# Patient Record
Sex: MALE | Race: BLACK OR AFRICAN AMERICAN | URBAN - METROPOLITAN AREA
[De-identification: names, ages, dates, MRNs, and addresses within clinical notes are randomized per-mention and may not be internally consistent; named-entity substitution may affect disease eponyms.]

---

## 2019-12-03 ENCOUNTER — NEW PATIENT (OUTPATIENT)
Dept: URBAN - METROPOLITAN AREA CLINIC 51 | Facility: CLINIC | Age: 69
End: 2019-12-03
Payer: MEDICARE

## 2019-12-03 DIAGNOSIS — E11.3521 DIABETES MELLITUS TYPE 2 WITH PROLIFERATIVE DIABET: ICD-10-CM

## 2019-12-03 DIAGNOSIS — Z96.1 PRESENCE OF INTRAOCULAR LENS: ICD-10-CM

## 2019-12-03 DIAGNOSIS — H59.022 CATARACT FRAGMENT IN EYE FOLLOWING CATARACT SURGERY OF LEFT EYE: ICD-10-CM

## 2019-12-03 DIAGNOSIS — E11.3552 DIABETES MELLITUS TYPE 2 WITH STABLE PROLIFERATIVE: ICD-10-CM

## 2019-12-03 DIAGNOSIS — H33.8 OTHER RETINAL DETACHMENTS: Primary | ICD-10-CM

## 2019-12-03 PROCEDURE — 92250 FUNDUS PHOTOGRAPHY W/I&R: CPT | Performed by: OPTOMETRIST

## 2019-12-03 PROCEDURE — 92004 COMPRE OPH EXAM NEW PT 1/>: CPT | Performed by: OPTOMETRIST

## 2019-12-03 ASSESSMENT — KERATOMETRY
OS: 44.03
OD: 44.12

## 2019-12-03 ASSESSMENT — INTRAOCULAR PRESSURE
OD: 8
OS: 10

## 2019-12-03 ASSESSMENT — VISUAL ACUITY
OD: 20/LP
OS: 20/25

## 2019-12-16 ENCOUNTER — FOLLOW UP ESTABLISHED (OUTPATIENT)
Dept: URBAN - METROPOLITAN AREA CLINIC 51 | Facility: CLINIC | Age: 69
End: 2019-12-16
Payer: MEDICARE

## 2019-12-16 PROCEDURE — 92235 FLUORESCEIN ANGRPH MLTIFRAME: CPT | Performed by: OPHTHALMOLOGY

## 2019-12-16 PROCEDURE — 92134 CPTRZ OPH DX IMG PST SGM RTA: CPT | Performed by: OPHTHALMOLOGY

## 2019-12-16 PROCEDURE — 67028 INJECTION EYE DRUG: CPT | Performed by: OPHTHALMOLOGY

## 2019-12-16 PROCEDURE — 92004 COMPRE OPH EXAM NEW PT 1/>: CPT | Performed by: OPHTHALMOLOGY

## 2019-12-16 ASSESSMENT — INTRAOCULAR PRESSURE
OS: 12
OD: 11

## 2020-01-29 ENCOUNTER — FOLLOW UP ESTABLISHED (OUTPATIENT)
Dept: URBAN - METROPOLITAN AREA CLINIC 51 | Facility: CLINIC | Age: 70
End: 2020-01-29
Payer: MEDICARE

## 2020-01-29 PROCEDURE — 92134 CPTRZ OPH DX IMG PST SGM RTA: CPT | Performed by: OPHTHALMOLOGY

## 2020-01-29 PROCEDURE — 92014 COMPRE OPH EXAM EST PT 1/>: CPT | Performed by: OPHTHALMOLOGY

## 2020-01-29 PROCEDURE — 92235 FLUORESCEIN ANGRPH MLTIFRAME: CPT | Performed by: OPHTHALMOLOGY

## 2020-01-29 ASSESSMENT — INTRAOCULAR PRESSURE
OS: 10
OD: 9

## 2020-10-22 ENCOUNTER — FOLLOW UP ESTABLISHED (OUTPATIENT)
Dept: URBAN - METROPOLITAN AREA CLINIC 51 | Facility: CLINIC | Age: 70
End: 2020-10-22
Payer: MEDICARE

## 2020-10-22 DIAGNOSIS — Z79.4 LONG TERM (CURRENT) USE OF INSULIN: ICD-10-CM

## 2020-10-22 PROCEDURE — 92235 FLUORESCEIN ANGRPH MLTIFRAME: CPT | Performed by: OPHTHALMOLOGY

## 2020-10-22 PROCEDURE — 92014 COMPRE OPH EXAM EST PT 1/>: CPT | Performed by: OPHTHALMOLOGY

## 2020-10-22 PROCEDURE — 92134 CPTRZ OPH DX IMG PST SGM RTA: CPT | Performed by: OPHTHALMOLOGY

## 2020-10-22 PROCEDURE — 67028 INJECTION EYE DRUG: CPT | Performed by: OPHTHALMOLOGY

## 2020-10-22 ASSESSMENT — INTRAOCULAR PRESSURE
OS: 13
OD: 10

## 2020-12-02 ENCOUNTER — FOLLOW UP ESTABLISHED (OUTPATIENT)
Dept: URBAN - METROPOLITAN AREA CLINIC 51 | Facility: CLINIC | Age: 70
End: 2020-12-02
Payer: MEDICARE

## 2020-12-02 PROCEDURE — 92235 FLUORESCEIN ANGRPH MLTIFRAME: CPT | Performed by: OPHTHALMOLOGY

## 2020-12-02 PROCEDURE — 99214 OFFICE O/P EST MOD 30 MIN: CPT | Performed by: OPHTHALMOLOGY

## 2020-12-02 PROCEDURE — 92134 CPTRZ OPH DX IMG PST SGM RTA: CPT | Performed by: OPHTHALMOLOGY

## 2020-12-02 ASSESSMENT — INTRAOCULAR PRESSURE
OS: 8
OD: 7

## 2021-03-08 ENCOUNTER — FOLLOW UP ESTABLISHED (OUTPATIENT)
Dept: URBAN - METROPOLITAN AREA CLINIC 51 | Facility: CLINIC | Age: 71
End: 2021-03-08
Payer: MEDICARE

## 2021-03-08 PROCEDURE — 99214 OFFICE O/P EST MOD 30 MIN: CPT | Performed by: OPHTHALMOLOGY

## 2021-03-08 PROCEDURE — 92235 FLUORESCEIN ANGRPH MLTIFRAME: CPT | Performed by: OPHTHALMOLOGY

## 2021-03-08 PROCEDURE — 92134 CPTRZ OPH DX IMG PST SGM RTA: CPT | Performed by: OPHTHALMOLOGY

## 2021-03-08 ASSESSMENT — INTRAOCULAR PRESSURE
OS: 7
OD: 17

## 2021-09-01 ENCOUNTER — OFFICE VISIT (OUTPATIENT)
Dept: URBAN - METROPOLITAN AREA CLINIC 51 | Facility: CLINIC | Age: 71
End: 2021-09-01
Payer: MEDICARE

## 2021-09-01 PROCEDURE — 92235 FLUORESCEIN ANGRPH MLTIFRAME: CPT | Performed by: OPHTHALMOLOGY

## 2021-09-01 PROCEDURE — 99214 OFFICE O/P EST MOD 30 MIN: CPT | Performed by: OPHTHALMOLOGY

## 2021-09-01 PROCEDURE — 67028 INJECTION EYE DRUG: CPT | Performed by: OPHTHALMOLOGY

## 2021-09-01 PROCEDURE — 92134 CPTRZ OPH DX IMG PST SGM RTA: CPT | Performed by: OPHTHALMOLOGY

## 2021-09-01 ASSESSMENT — INTRAOCULAR PRESSURE
OS: 19
OD: 8

## 2021-09-01 NOTE — IMPRESSION/PLAN
Impression: Proliferative Diabetic Retinopathy, OS.
s/p Avastin 12/02/20 Plan: Exam, OCT, and FA reveal PDR, NV Goal is to preserve the VF. He can have more PRP if needed. Inject Avastin as necessary. Schedule 1 Month Reeval, Dil OU, OCT, RNFL.

## 2021-09-01 NOTE — IMPRESSION/PLAN
Impression: Insulin Plan: Last HA1C of 5, taken 09/19. Blood sugar control. Denies tobacco usage. Reports hypertension.

## 2021-09-01 NOTE — IMPRESSION/PLAN
Impression: Proliferative Diabetic Retinopathy, TRD, OD.
s/p PPV x 2 - Dr. Alexandru Briceño. Plan: Hd 2 surgeries. Informed him despite the surgeries, which were well performed. He has no further options for surgery.

## 2021-10-06 ENCOUNTER — OFFICE VISIT (OUTPATIENT)
Dept: URBAN - METROPOLITAN AREA CLINIC 51 | Facility: CLINIC | Age: 71
End: 2021-10-06
Payer: MEDICARE

## 2021-10-06 DIAGNOSIS — E11.3592 TYPE 2 DIABETES MELLITUS WITH PROLIFERATIVE DIABETIC RETINOPATHY WITHOUT MACULAR EDEMA, LEFT EYE: Primary | ICD-10-CM

## 2021-10-06 PROCEDURE — 99214 OFFICE O/P EST MOD 30 MIN: CPT | Performed by: OPHTHALMOLOGY

## 2021-10-06 PROCEDURE — 67028 INJECTION EYE DRUG: CPT | Performed by: OPHTHALMOLOGY

## 2021-10-06 PROCEDURE — 92134 CPTRZ OPH DX IMG PST SGM RTA: CPT | Performed by: OPHTHALMOLOGY

## 2021-10-06 ASSESSMENT — INTRAOCULAR PRESSURE
OS: 7
OD: 7

## 2021-10-06 NOTE — IMPRESSION/PLAN
Impression: Proliferative Diabetic Retinopathy, OS.
s/p Avastin 09/01/21 Plan: Exam, OCT, and FA reveal PDR, NV Goal is to preserve the VF. He can have more PRP if needed. Inject Avastin as necessary. 6 Week Reeval, Dil OU, OCT, RNFL, FA OD>OS, quads.

## 2021-10-06 NOTE — IMPRESSION/PLAN
Impression: Proliferative Diabetic Retinopathy, TRD, OD.
s/p PPV x 2 - Dr. Gardenr List. Plan: Hd 2 surgeries. Informed him despite the surgeries, which were well performed. He has no further options for surgery.

## 2021-11-18 ENCOUNTER — OFFICE VISIT (OUTPATIENT)
Dept: URBAN - METROPOLITAN AREA CLINIC 51 | Facility: CLINIC | Age: 71
End: 2021-11-18
Payer: MEDICARE

## 2021-11-18 PROCEDURE — 92235 FLUORESCEIN ANGRPH MLTIFRAME: CPT | Performed by: OPHTHALMOLOGY

## 2021-11-18 PROCEDURE — 99214 OFFICE O/P EST MOD 30 MIN: CPT | Performed by: OPHTHALMOLOGY

## 2021-11-18 PROCEDURE — 92134 CPTRZ OPH DX IMG PST SGM RTA: CPT | Performed by: OPHTHALMOLOGY

## 2021-11-18 ASSESSMENT — INTRAOCULAR PRESSURE
OD: 5
OS: 11

## 2021-11-18 NOTE — IMPRESSION/PLAN
Impression: Proliferative Diabetic Retinopathy, TRD, OD.
s/p PPV x 2 - Dr. Matty Burns. Plan: Had 2 surgeries. Informed him despite the surgeries, which were well performed. He has no further options for surgery.

## 2021-11-18 NOTE — IMPRESSION/PLAN
Impression: Proliferative Diabetic Retinopathy, OS.
s/p Avastin 10/06/21 Plan: Exam, OCT, and FA reveal PDR, NV Goal is to preserve the VF. He can have more PRP if needed. Continue Avastin as necessary. 3 Month Reeval, Dil OU, OCT, RNFL, FA OD>OS, quads.

## 2022-03-07 ENCOUNTER — OFFICE VISIT (OUTPATIENT)
Dept: URBAN - METROPOLITAN AREA CLINIC 51 | Facility: CLINIC | Age: 72
End: 2022-03-07
Payer: MEDICARE

## 2022-03-07 PROCEDURE — 67028 INJECTION EYE DRUG: CPT | Performed by: OPHTHALMOLOGY

## 2022-03-07 PROCEDURE — 99214 OFFICE O/P EST MOD 30 MIN: CPT | Performed by: OPHTHALMOLOGY

## 2022-03-07 PROCEDURE — 92134 CPTRZ OPH DX IMG PST SGM RTA: CPT | Performed by: OPHTHALMOLOGY

## 2022-03-07 ASSESSMENT — INTRAOCULAR PRESSURE
OS: 13
OD: 12

## 2022-03-07 NOTE — IMPRESSION/PLAN
Impression: Proliferative Diabetic Retinopathy, TRD, OD.
s/p PPV x 2 - Dr. Bob Lovett. Plan: Had 2 surgeries. Informed him despite the surgeries, which were well performed. He has no further options for surgery.

## 2022-03-07 NOTE — IMPRESSION/PLAN
Impression: Proliferative Diabetic Retinopathy, OS.
s/p Avastin 10/06/21 Plan: Exam, OCT, and FA reveal PDR, NV Goal is to preserve the VF. He can have more PRP if needed. Inject Avastin today, plan injection every 2 months.

## 2022-04-20 ENCOUNTER — OFFICE VISIT (OUTPATIENT)
Dept: URBAN - METROPOLITAN AREA CLINIC 51 | Facility: CLINIC | Age: 72
End: 2022-04-20
Payer: MEDICARE

## 2022-04-20 DIAGNOSIS — E11.3521 TYPE 2 DIABETES MELLITUS WITH PROLIFERATIVE DIABETIC RETINOPATHY WITH TRACTION RETINAL DETACHMENT INVOLVING THE MACULA, RIGHT EYE: Primary | ICD-10-CM

## 2022-04-20 DIAGNOSIS — E11.3592 TYPE 2 DIABETES MELLITUS WITH PROLIFERATIVE DIABETIC RETINOPATHY WITHOUT MACULAR EDEMA, LEFT EYE: ICD-10-CM

## 2022-04-20 PROCEDURE — 99212 OFFICE O/P EST SF 10 MIN: CPT | Performed by: OPHTHALMOLOGY

## 2022-04-20 PROCEDURE — 67028 INJECTION EYE DRUG: CPT | Performed by: OPHTHALMOLOGY

## 2022-04-20 PROCEDURE — 92235 FLUORESCEIN ANGRPH MLTIFRAME: CPT | Performed by: OPHTHALMOLOGY

## 2022-04-20 PROCEDURE — 92134 CPTRZ OPH DX IMG PST SGM RTA: CPT | Performed by: OPHTHALMOLOGY

## 2022-04-20 ASSESSMENT — INTRAOCULAR PRESSURE
OS: 11
OD: 11

## 2022-04-20 NOTE — IMPRESSION/PLAN
Impression: Proliferative Diabetic Retinopathy, TRD, OD.
s/p PPV x 2 - Dr. Simon Ortega. Plan: Had 2 surgeries. Informed him despite the surgeries, which were well performed. He has no further options for surgery.

## 2022-06-27 ENCOUNTER — OFFICE VISIT (OUTPATIENT)
Dept: URBAN - METROPOLITAN AREA CLINIC 51 | Facility: CLINIC | Age: 72
End: 2022-06-27
Payer: MEDICARE

## 2022-06-27 DIAGNOSIS — E11.3521 TYPE 2 DIABETES MELLITUS WITH PROLIFERATIVE DIABETIC RETINOPATHY WITH TRACTION RETINAL DETACHMENT INVOLVING THE MACULA, RIGHT EYE: Primary | ICD-10-CM

## 2022-06-27 DIAGNOSIS — E11.3592 TYPE 2 DIABETES MELLITUS WITH PROLIFERATIVE DIABETIC RETINOPATHY WITHOUT MACULAR EDEMA, LEFT EYE: ICD-10-CM

## 2022-06-27 PROCEDURE — 92235 FLUORESCEIN ANGRPH MLTIFRAME: CPT | Performed by: OPHTHALMOLOGY

## 2022-06-27 PROCEDURE — 67028 INJECTION EYE DRUG: CPT | Performed by: OPHTHALMOLOGY

## 2022-06-27 PROCEDURE — 99212 OFFICE O/P EST SF 10 MIN: CPT | Performed by: OPHTHALMOLOGY

## 2022-06-27 PROCEDURE — 92134 CPTRZ OPH DX IMG PST SGM RTA: CPT | Performed by: OPHTHALMOLOGY

## 2022-06-27 ASSESSMENT — INTRAOCULAR PRESSURE
OS: 12
OD: 8

## 2022-06-27 NOTE — IMPRESSION/PLAN
Impression: Proliferative Diabetic Retinopathy, TRD, OD.
s/p PPV x 2 - Dr. Db Menjivar. Plan: Had 2 surgeries. Informed him despite the surgeries, which were well performed. He has no further options for surgery.

## 2022-08-25 ENCOUNTER — OFFICE VISIT (OUTPATIENT)
Dept: URBAN - METROPOLITAN AREA CLINIC 51 | Facility: CLINIC | Age: 72
End: 2022-08-25
Payer: MEDICARE

## 2022-08-25 DIAGNOSIS — E11.3592 TYPE 2 DIABETES MELLITUS WITH PROLIFERATIVE DIABETIC RETINOPATHY WITHOUT MACULAR EDEMA, LEFT EYE: ICD-10-CM

## 2022-08-25 DIAGNOSIS — E11.3521 TYPE 2 DIABETES MELLITUS WITH PROLIFERATIVE DIABETIC RETINOPATHY WITH TRACTION RETINAL DETACHMENT INVOLVING THE MACULA, RIGHT EYE: Primary | ICD-10-CM

## 2022-08-25 DIAGNOSIS — H59.022 CATARACT (LENS) FRAGMENTS IN EYE FOLLOWING CATARACT SURGERY, LEFT EYE: ICD-10-CM

## 2022-08-25 PROCEDURE — 67028 INJECTION EYE DRUG: CPT | Performed by: OPHTHALMOLOGY

## 2022-08-25 PROCEDURE — 92134 CPTRZ OPH DX IMG PST SGM RTA: CPT | Performed by: OPHTHALMOLOGY

## 2022-08-25 PROCEDURE — 99212 OFFICE O/P EST SF 10 MIN: CPT | Performed by: OPHTHALMOLOGY

## 2022-08-25 ASSESSMENT — INTRAOCULAR PRESSURE
OS: 9
OD: 9

## 2022-08-25 NOTE — IMPRESSION/PLAN
Impression: Proliferative Diabetic Retinopathy, TRD, OD.
s/p PPV x 2 - Dr. Dipti García. Plan: Had 2 surgeries. Informed him despite the surgeries, which were well performed. He has no further options for surgery.

## 2022-08-25 NOTE — IMPRESSION/PLAN
Impression: Proliferative Diabetic Retinopathy, OS. Plan: Exam, OCT, and FA reveal PDR, NV Goal is to preserve the VF. He can have  PRP if needed. Inject Avastin OS today, plan injection every 2 months.

## 2022-11-02 ENCOUNTER — OFFICE VISIT (OUTPATIENT)
Dept: URBAN - METROPOLITAN AREA CLINIC 51 | Facility: CLINIC | Age: 72
End: 2022-11-02
Payer: MEDICARE

## 2022-11-02 DIAGNOSIS — E11.3521 TYPE 2 DIABETES MELLITUS WITH PROLIFERATIVE DIABETIC RETINOPATHY WITH TRACTION RETINAL DETACHMENT INVOLVING THE MACULA, RIGHT EYE: ICD-10-CM

## 2022-11-02 DIAGNOSIS — E11.3592 DIABETES MELLITUS TYPE 2 WITH PROLIFERATIVE RETINOPATHY WITHOUT MACULAR EDEMA, LEFT EYE: Primary | ICD-10-CM

## 2022-11-02 PROCEDURE — 92134 CPTRZ OPH DX IMG PST SGM RTA: CPT | Performed by: OPHTHALMOLOGY

## 2022-11-02 PROCEDURE — 92235 FLUORESCEIN ANGRPH MLTIFRAME: CPT | Performed by: OPHTHALMOLOGY

## 2022-11-02 PROCEDURE — 99212 OFFICE O/P EST SF 10 MIN: CPT | Performed by: OPHTHALMOLOGY

## 2022-11-02 PROCEDURE — 67028 INJECTION EYE DRUG: CPT | Performed by: OPHTHALMOLOGY

## 2022-11-02 ASSESSMENT — INTRAOCULAR PRESSURE
OS: 12
OD: 12

## 2022-11-02 NOTE — IMPRESSION/PLAN
Impression: Proliferative Diabetic Retinopathy, TRD, OD.
s/p PPV x 2 - Dr. Nolvia Le. Plan: Had 2 surgeries. Informed him despite the surgeries, which were well performed. He has no further options for surgery.

## 2023-01-30 ENCOUNTER — OFFICE VISIT (OUTPATIENT)
Dept: URBAN - METROPOLITAN AREA CLINIC 51 | Facility: CLINIC | Age: 73
End: 2023-01-30
Payer: MEDICARE

## 2023-01-30 DIAGNOSIS — Z79.4 LONG TERM (CURRENT) USE OF INSULIN: ICD-10-CM

## 2023-01-30 DIAGNOSIS — E11.3592 TYPE 2 DIABETES MELLITUS WITH PROLIFERATIVE DIABETIC RETINOPATHY WITHOUT MACULAR EDEMA, LEFT EYE: Primary | ICD-10-CM

## 2023-01-30 PROCEDURE — 67028 INJECTION EYE DRUG: CPT | Performed by: OPHTHALMOLOGY

## 2023-01-30 PROCEDURE — 92134 CPTRZ OPH DX IMG PST SGM RTA: CPT | Performed by: OPHTHALMOLOGY

## 2023-01-30 ASSESSMENT — INTRAOCULAR PRESSURE
OS: 11
OD: 13

## 2023-01-30 NOTE — IMPRESSION/PLAN
Impression: Proliferative Diabetic Retinopathy, OS. Plan: Exam, OCT, and FA reveal PDR, NV Goal is to preserve the VF. He can have  PRP if needed.  Inject Avastin OS today, plan injection every 2 months with Dr Sherry Mendenhall

## 2023-04-03 ENCOUNTER — OFFICE VISIT (OUTPATIENT)
Dept: URBAN - METROPOLITAN AREA CLINIC 51 | Facility: CLINIC | Age: 73
End: 2023-04-03
Payer: MEDICARE

## 2023-04-03 DIAGNOSIS — E11.3592 TYPE 2 DIABETES MELLITUS WITH PROLIFERATIVE DIABETIC RETINOPATHY WITHOUT MACULAR EDEMA, LEFT EYE: Primary | ICD-10-CM

## 2023-04-03 DIAGNOSIS — E11.3521 TYPE 2 DIABETES MELLITUS WITH PROLIFERATIVE DIABETIC RETINOPATHY WITH TRACTION RETINAL DETACHMENT INVOLVING THE MACULA, RIGHT EYE: ICD-10-CM

## 2023-04-03 PROCEDURE — 67028 INJECTION EYE DRUG: CPT | Performed by: OPHTHALMOLOGY

## 2023-04-03 PROCEDURE — 99212 OFFICE O/P EST SF 10 MIN: CPT | Performed by: OPHTHALMOLOGY

## 2023-04-03 PROCEDURE — 92235 FLUORESCEIN ANGRPH MLTIFRAME: CPT | Performed by: OPHTHALMOLOGY

## 2023-04-03 PROCEDURE — 92134 CPTRZ OPH DX IMG PST SGM RTA: CPT | Performed by: OPHTHALMOLOGY

## 2023-04-03 ASSESSMENT — INTRAOCULAR PRESSURE
OD: 8
OS: 8

## 2023-04-03 NOTE — IMPRESSION/PLAN
Impression: Proliferative Diabetic Retinopathy, TRD, OD.
s/p PPV x 2 - Dr. Jeffrey Stark. Plan: Had 2 surgeries. Informed him despite the surgeries, which were well performed. He has no further options for surgery.

## 2023-06-07 ENCOUNTER — OFFICE VISIT (OUTPATIENT)
Dept: URBAN - METROPOLITAN AREA CLINIC 51 | Facility: CLINIC | Age: 73
End: 2023-06-07
Payer: MEDICARE

## 2023-06-07 DIAGNOSIS — E11.3592 TYPE 2 DIABETES MELLITUS WITH PROLIFERATIVE DIABETIC RETINOPATHY WITHOUT MACULAR EDEMA, LEFT EYE: Primary | ICD-10-CM

## 2023-06-07 DIAGNOSIS — E11.3521 TYPE 2 DIABETES MELLITUS WITH PROLIFERATIVE DIABETIC RETINOPATHY WITH TRACTION RETINAL DETACHMENT INVOLVING THE MACULA, RIGHT EYE: ICD-10-CM

## 2023-06-07 PROCEDURE — 99212 OFFICE O/P EST SF 10 MIN: CPT | Performed by: OPHTHALMOLOGY

## 2023-06-07 PROCEDURE — 92134 CPTRZ OPH DX IMG PST SGM RTA: CPT | Performed by: OPHTHALMOLOGY

## 2023-06-07 PROCEDURE — 67028 INJECTION EYE DRUG: CPT | Performed by: OPHTHALMOLOGY

## 2023-06-07 ASSESSMENT — INTRAOCULAR PRESSURE
OD: 6
OS: 10

## 2023-06-07 NOTE — IMPRESSION/PLAN
Impression: Proliferative Diabetic Retinopathy, TRD, OD.
s/p PPV x 2 - Dr. Dahlia Rankin. Plan: Had 2 surgeries. Informed him despite the surgeries, which were well performed. He has no further options for surgery.